# Patient Record
Sex: MALE | Race: BLACK OR AFRICAN AMERICAN | URBAN - METROPOLITAN AREA
[De-identification: names, ages, dates, MRNs, and addresses within clinical notes are randomized per-mention and may not be internally consistent; named-entity substitution may affect disease eponyms.]

---

## 2021-09-02 ENCOUNTER — HOSPITAL ENCOUNTER (EMERGENCY)
Age: 46
Discharge: LEFT BEFORE DISCHARGE | End: 2021-09-02
Payer: OTHER GOVERNMENT

## 2021-09-02 VITALS
SYSTOLIC BLOOD PRESSURE: 136 MMHG | HEIGHT: 69 IN | BODY MASS INDEX: 27.62 KG/M2 | TEMPERATURE: 97 F | DIASTOLIC BLOOD PRESSURE: 94 MMHG | RESPIRATION RATE: 14 BRPM | HEART RATE: 84 BPM | WEIGHT: 186.51 LBS | OXYGEN SATURATION: 98 %

## 2021-09-02 PROCEDURE — 4500000002 HC ER NO CHARGE

## 2021-09-02 PROCEDURE — U0003 INFECTIOUS AGENT DETECTION BY NUCLEIC ACID (DNA OR RNA); SEVERE ACUTE RESPIRATORY SYNDROME CORONAVIRUS 2 (SARS-COV-2) (CORONAVIRUS DISEASE [COVID-19]), AMPLIFIED PROBE TECHNIQUE, MAKING USE OF HIGH THROUGHPUT TECHNOLOGIES AS DESCRIBED BY CMS-2020-01-R: HCPCS

## 2021-09-02 PROCEDURE — U0005 INFEC AGEN DETEC AMPLI PROBE: HCPCS

## 2021-09-03 ENCOUNTER — CARE COORDINATION (OUTPATIENT)
Dept: CARE COORDINATION | Age: 46
End: 2021-09-03

## 2021-09-03 LAB — SARS-COV-2: DETECTED

## 2021-09-03 NOTE — ED NOTES
Per registration the patient walked out     [facility]  09/02/21 2016

## 2021-09-07 ENCOUNTER — CARE COORDINATION (OUTPATIENT)
Dept: CARE COORDINATION | Age: 46
End: 2021-09-07

## 2021-09-07 NOTE — CARE COORDINATION
Second attempt to reach pt as follow up from ER visit r/t risk for covid based on presenting sx and/ or medical hx. No answer, LM on VM with this ACM name and number to please call with questions/ concerns.     No further outreach planned

## 2021-09-08 ENCOUNTER — CARE COORDINATION (OUTPATIENT)
Dept: CARE COORDINATION | Age: 46
End: 2021-09-08

## 2021-09-08 NOTE — CARE COORDINATION
Return call from patient who LM on this ACM VM re feeling better,loss of taste and smell lingers, however no other sx. Attempted to call patient back, \" [employer] call cannot be completed as dialed. \"  Tried 3 times.    No further outreach planned

## 2024-11-22 ENCOUNTER — HOSPITAL ENCOUNTER (EMERGENCY)
Age: 49
Discharge: HOME | End: 2024-11-22
Attending: EMERGENCY MEDICINE
Payer: COMMERCIAL

## 2024-11-22 VITALS
DIASTOLIC BLOOD PRESSURE: 117 MMHG | RESPIRATION RATE: 18 BRPM | HEIGHT: 71 IN | WEIGHT: 185.85 LBS | HEART RATE: 78 BPM | SYSTOLIC BLOOD PRESSURE: 163 MMHG | OXYGEN SATURATION: 100 % | BODY MASS INDEX: 26.02 KG/M2

## 2024-11-22 DIAGNOSIS — I10 ASYMPTOMATIC HYPERTENSION: Primary | ICD-10-CM

## 2024-11-22 LAB
ANION GAP SERPL CALCULATED.3IONS-SCNC: 17 MMOL/L (ref 3–16)
BASOPHILS # BLD: 0 K/UL (ref 0–0.2)
BASOPHILS NFR BLD: 0.6 %
BUN SERPL-MCNC: 11 MG/DL (ref 7–20)
CALCIUM SERPL-MCNC: 9.4 MG/DL (ref 8.3–10.6)
CHLORIDE SERPL-SCNC: 101 MMOL/L (ref 99–110)
CO2 SERPL-SCNC: 23 MMOL/L (ref 21–32)
CREAT SERPL-MCNC: 1 MG/DL (ref 0.9–1.3)
DEPRECATED RDW RBC AUTO: 12.8 % (ref 12.4–15.4)
EOSINOPHIL # BLD: 0.3 K/UL (ref 0–0.6)
EOSINOPHIL NFR BLD: 5.1 %
GFR SERPLBLD CREATININE-BSD FMLA CKD-EPI: >90 ML/MIN/{1.73_M2}
GLUCOSE SERPL-MCNC: 141 MG/DL (ref 70–99)
HCT VFR BLD AUTO: 43.6 % (ref 40.5–52.5)
HGB BLD-MCNC: 15 G/DL (ref 13.5–17.5)
LYMPHOCYTES # BLD: 2.5 K/UL (ref 1–5.1)
LYMPHOCYTES NFR BLD: 44.9 %
MAGNESIUM SERPL-MCNC: 2.1 MG/DL (ref 1.8–2.4)
MCH RBC QN AUTO: 31.7 PG (ref 26–34)
MCHC RBC AUTO-ENTMCNC: 34.4 G/DL (ref 31–36)
MCV RBC AUTO: 92.1 FL (ref 80–100)
MONOCYTES # BLD: 0.5 K/UL (ref 0–1.3)
MONOCYTES NFR BLD: 8.1 %
NEUTROPHILS # BLD: 2.3 K/UL (ref 1.7–7.7)
NEUTROPHILS NFR BLD: 41.3 %
PLATELET # BLD AUTO: 223 K/UL (ref 135–450)
PMV BLD AUTO: 7.3 FL (ref 5–10.5)
POTASSIUM SERPL-SCNC: 3.6 MMOL/L (ref 3.5–5.1)
RBC # BLD AUTO: 4.73 M/UL (ref 4.2–5.9)
SODIUM SERPL-SCNC: 141 MMOL/L (ref 136–145)
TROPONIN, HIGH SENSITIVITY: 19 NG/L (ref 0–22)
WBC # BLD AUTO: 5.5 K/UL (ref 4–11)

## 2024-11-22 PROCEDURE — 36415 COLL VENOUS BLD VENIPUNCTURE: CPT

## 2024-11-22 PROCEDURE — 6370000000 HC RX 637 (ALT 250 FOR IP): Performed by: EMERGENCY MEDICINE

## 2024-11-22 PROCEDURE — 83735 ASSAY OF MAGNESIUM: CPT

## 2024-11-22 PROCEDURE — 85025 COMPLETE CBC W/AUTO DIFF WBC: CPT

## 2024-11-22 PROCEDURE — 99283 EMERGENCY DEPT VISIT LOW MDM: CPT

## 2024-11-22 PROCEDURE — 84484 ASSAY OF TROPONIN QUANT: CPT

## 2024-11-22 PROCEDURE — 80048 BASIC METABOLIC PNL TOTAL CA: CPT

## 2024-11-22 RX ORDER — AMLODIPINE BESYLATE 5 MG/1
5 TABLET ORAL DAILY
Qty: 30 TABLET | Refills: 0 | Status: SHIPPED | OUTPATIENT
Start: 2024-11-22

## 2024-11-22 RX ORDER — AMLODIPINE BESYLATE 5 MG/1
5 TABLET ORAL ONCE
Status: COMPLETED | OUTPATIENT
Start: 2024-11-22 | End: 2024-11-22

## 2024-11-22 RX ADMIN — AMLODIPINE BESYLATE 5 MG: 5 TABLET ORAL at 21:36

## 2024-11-22 ASSESSMENT — ENCOUNTER SYMPTOMS
RHINORRHEA: 0
NAUSEA: 0
EYE REDNESS: 0
COUGH: 0
BACK PAIN: 0
VOMITING: 0
DIARRHEA: 0
EYE PAIN: 0
ABDOMINAL PAIN: 0
CONSTIPATION: 0
SHORTNESS OF BREATH: 0

## 2024-11-22 ASSESSMENT — PAIN - FUNCTIONAL ASSESSMENT: PAIN_FUNCTIONAL_ASSESSMENT: NONE - DENIES PAIN

## 2024-11-23 NOTE — ED PROVIDER NOTES
[facility]  EMERGENCY DEPARTMENT ENCOUNTER        Pt Name: Ramses Staples  MRN: [id number]  Birthdate 1975  Date of evaluation: 11/22/2024  Provider: Holley Alvraado DO  PCP: Michelet Zavala DO  Note Started: 9:24 PM EST 11/22/24    CHIEF COMPLAINT      Hypertension    HISTORY OF PRESENT ILLNESS: 1 or more Elements     Chief Complaint   Patient presents with    Hypertension     Pt presented with high blood pressure, pt stated he has a headache at home and he did check B/P and was high, no symptom reported, denies ant pain , denies N/V      History from : Patient  Limitations to history : None    Ramses Staples is a 49 y.o. male who presents to the emergency department secondary to concern for hypertension. Reports that he used to take amlodipine for his blood pressure, but he prescribed himself off of them. Today, his wife had a headache so he checked her blood pressure and then he decided to check his own blood pressure and it was very high.  Denies any symptoms.    Past medical history noted below. Aside from what is stated above denies any other symptoms or modifying factors.   reports that he has never smoked. He does not have any smokeless tobacco history on file. He reports current alcohol use. He reports that he does not use drugs.  Nursing Notes were all reviewed and agreed with or any disagreements addressed in HPI/MDM.  REVIEW OF SYSTEMS :    Review of Systems   Constitutional:  Negative for chills and fever.   HENT:  Negative for congestion and rhinorrhea.    Eyes:  Negative for pain and redness.   Respiratory:  Negative for cough and shortness of breath.    Cardiovascular:  Negative for chest pain and leg swelling.   Gastrointestinal:  Negative for abdominal pain, constipation, diarrhea, nausea and vomiting.   Genitourinary:  Negative for frequency and urgency.   Musculoskeletal:  Negative for back pain and neck pain.   Skin:  Negative for rash.   Neurological:   presents to the emergency department secondary to concern for symptoms as noted in HPI above.     On presentation he is in no acute distress.  Initial blood pressure 170/127. Patient does not have any symptoms.  He is alert noted x 4 with a GCS of 15. No focal neurological deficit. He was given a dose of Norvasc in the ED. Lab workup negative for anemia, leukocytosis or electrolyte abnormalities. Troponin within normal levels. No evidence of JOY. Therefore, blood work today does not show any evidence of endorgan damage. Repeat blood pressure in the ED was 163/117.  Patient provided with a prescription for Norvasc and advised to follow with primary care physician as soon as possible for medication dose adjustments. Patient understands and agrees with plan going forward. He was given strict ED return precautions.    A peripheral IV was placed, labs were ordered.    After discussion of results, diagnosis, and symptomatic care, I reiterated return precautions and importance of follow-up.   He Patient expressed understanding of all instruction. The patient was in agreement with plan, and all questions were answered. He was discharged home in stable condition.          Disposition Considerations (tests considered but not done, Shared Decision Making, Pt Expectation of Test or Tx.): Appropriate for outpatient management    I estimate there is low risk for sepsis, MI, stroke, tamponade, PTX, toxicity, or other life threatening etiology. Given the best available information and clinical assessment I estimate the risk of hospitalization to be greater than risk of treatment at home. We discussed and I explained the risk could rapidly change and return precautions and instructions given. Discharge disposition is reasonable.     I am the Primary Clinician of Record.  FINAL IMPRESSION      1. Asymptomatic hypertension          DISPOSITION/PLAN   DISPOSITION Decision To Discharge 11/22/2024 10:12:53 PM           PATIENT REFERRED

## 2024-11-23 NOTE — ED NOTES
Patient has been cleared for discharge from the Emergency Department. Departure condition assessed as good, with the patient able to ambulate. Discharge instructions thoroughly reviewed.Patient instructed to follow up, emphasizing  pain management, and medication regimen. Patient and family both demonstrated clear understanding of instructions.Patient was given the signs and symptoms of worsening condition and told to return if they occurred. Patient understands the plan and management and all questions were answered.

## 2024-12-05 ENCOUNTER — APPOINTMENT (OUTPATIENT)
Dept: GENERAL RADIOLOGY | Age: 49
End: 2024-12-05
Payer: COMMERCIAL

## 2024-12-05 ENCOUNTER — HOSPITAL ENCOUNTER (EMERGENCY)
Age: 49
Discharge: HOME | End: 2024-12-05
Payer: COMMERCIAL

## 2024-12-05 VITALS
HEART RATE: 67 BPM | HEIGHT: 71 IN | BODY MASS INDEX: 25.9 KG/M2 | OXYGEN SATURATION: 98 % | TEMPERATURE: 97.7 F | SYSTOLIC BLOOD PRESSURE: 165 MMHG | WEIGHT: 185 LBS | DIASTOLIC BLOOD PRESSURE: 111 MMHG | RESPIRATION RATE: 14 BRPM

## 2024-12-05 DIAGNOSIS — I10 UNCONTROLLED HYPERTENSION: ICD-10-CM

## 2024-12-05 DIAGNOSIS — M25.532 LEFT WRIST PAIN: Primary | ICD-10-CM

## 2024-12-05 PROCEDURE — 99283 EMERGENCY DEPT VISIT LOW MDM: CPT

## 2024-12-05 PROCEDURE — 73110 X-RAY EXAM OF WRIST: CPT

## 2024-12-05 PROCEDURE — 73130 X-RAY EXAM OF HAND: CPT

## 2024-12-05 PROCEDURE — 6370000000 HC RX 637 (ALT 250 FOR IP)

## 2024-12-05 RX ORDER — NAPROXEN 250 MG/1
500 TABLET ORAL ONCE
Status: COMPLETED | OUTPATIENT
Start: 2024-12-05 | End: 2024-12-05

## 2024-12-05 RX ORDER — NAPROXEN 500 MG/1
500 TABLET ORAL 2 TIMES DAILY WITH MEALS
Qty: 60 TABLET | Refills: 0 | Status: SHIPPED | OUTPATIENT
Start: 2024-12-05

## 2024-12-05 RX ADMIN — NAPROXEN SODIUM 500 MG: 250 TABLET ORAL at 18:50

## 2024-12-05 ASSESSMENT — PAIN DESCRIPTION - DESCRIPTORS: DESCRIPTORS: ACHING;SORE

## 2024-12-05 ASSESSMENT — ENCOUNTER SYMPTOMS
COLOR CHANGE: 0
NAUSEA: 0
VOMITING: 0

## 2024-12-05 ASSESSMENT — PAIN SCALES - GENERAL
PAINLEVEL_OUTOF10: 6

## 2024-12-05 ASSESSMENT — PAIN DESCRIPTION - ONSET: ONSET: ON-GOING

## 2024-12-05 ASSESSMENT — PAIN DESCRIPTION - LOCATION: LOCATION: HAND

## 2024-12-05 ASSESSMENT — PAIN - FUNCTIONAL ASSESSMENT: PAIN_FUNCTIONAL_ASSESSMENT: 0-10

## 2024-12-05 ASSESSMENT — PAIN DESCRIPTION - FREQUENCY: FREQUENCY: CONTINUOUS

## 2024-12-05 ASSESSMENT — PAIN DESCRIPTION - ORIENTATION: ORIENTATION: LEFT

## 2024-12-05 ASSESSMENT — PAIN DESCRIPTION - PAIN TYPE: TYPE: CHRONIC PAIN

## 2024-12-05 NOTE — ED PROVIDER NOTES
[facility]  EMERGENCY DEPARTMENT ENCOUNTER        Pt Name: Ramses Staples  MRN: [id number]  Birthdate 1975  Date of evaluation: 12/5/2024  Provider: VA Torres - CNP  PCP: Michelet Zavala DO  Note Started: 6:38 PM EST 12/5/24      BHARAT. I have evaluated this patient.        CHIEF COMPLAINT       Chief Complaint   Patient presents with    Hand Pain     Left thumb  he thinks it is arthritis  Pain with movement  taking home remedies for pain that are not working       HISTORY OF PRESENT ILLNESS: 1 or more Elements     History From: Patient, family member at bedside, EMR review    Chief Complaint: Left thumb pain    Ramses Staples is a 49 y.o. male with a past medical history notable for hypertension, seasonal allergic rhinitis who presents to the emergency department for evaluation of atraumatic left thumb pain.  States that he first noted his symptoms about a month ago.  He got a \"herbal concoction off of the Internet\" which he believes contained jaspreet and Rosemary.  He did trial this twice for his symptoms without any improvement.  Denies any preceding traumatic injury.  Patient is left-hand dominant and works in an occupation which requires frequent use of various tools.  Has not appreciated any discoloration to the area of concern.  No associated fevers, chills, nausea, vomiting.  States that he is concerned that he potentially has arthritis due to the overuse of his dominant hand.    Nursing Notes were all reviewed and agreed with or any disagreements were addressed in the HPI.    REVIEW OF SYSTEMS :      Review of Systems   Constitutional:  Negative for chills and fever.   Gastrointestinal:  Negative for nausea and vomiting.   Musculoskeletal:  Positive for arthralgias. Negative for joint swelling.   Skin:  Negative for color change and wound.   Neurological:  Negative for headaches.   All other systems reviewed and are negative.      Positives and Pertinent

## 2024-12-05 NOTE — DISCHARGE INSTRUCTIONS
X-rays not show any fractures or dislocations.  There is minimal degenerative changes which could be contributing to your symptoms.  Wrist splint for comfort.  You can take it off to go to work and take it off to sleep if it is bothersome.  Use it for comfort otherwise.      I did send a nonsteroidal anti-inflammatory drug called naproxen to your pharmacy.  This is similar to Motrin, ibuprofen, Aleve, Advil, meloxicam, diclofenac.  Do not take additional nonsteroidal anti-inflammatory drugs with this medication however you can take 500 mg to 1000 mg of Tylenol every 6 hours in between doses and you can apply any topical pain relief such as IcyHot or Biofreeze for additional relief of symptoms.    When you do not have your brace off make sure that you flex and extend the wrist by making the \"yes\" sign as well as abduct and adduct the wrist to making the \"no\" sign.  This is essentially physical therapy exercises.    Follow-up with your primary care as needed.    Your blood pressure was elevated in the emergency department.  Continue taking your blood pressure medication.  Continue checking your blood pressure at home and keep a log.  Take your blood pressure log to you with your follow-up appointment with your primary care doctor for a formal blood pressure check in 2 weeks as it was discussed with you with your most recent appointment with your primary care doctor.  This will help your primary care doctor know if they need to titrate your medication or add a third blood pressure medication.

## 2024-12-06 NOTE — ED NOTES
D/C: Order noted for d/c. Pt confirmed d/c paperwork has correct name. Discharge and education instructions reviewed with patient. Teach-back successful.  Pt verbalized understanding and denied questions at this time. No acute distress noted. Patient instructed to follow-up as noted - return to emergency department if symptoms worsen. Patient verbalized understanding. Discharged per EDMD with discharge instructions. Pt discharged to private vehicle. Patient stable upon departure. Thanked patient for [facility] for care. Provider aware of patient pain at time of discharge.